# Patient Record
Sex: FEMALE
[De-identification: names, ages, dates, MRNs, and addresses within clinical notes are randomized per-mention and may not be internally consistent; named-entity substitution may affect disease eponyms.]

---

## 2018-03-07 ENCOUNTER — HOSPITAL ENCOUNTER (EMERGENCY)
Dept: HOSPITAL 41 - JD.ED | Age: 43
Discharge: HOME | End: 2018-03-07
Payer: COMMERCIAL

## 2018-03-07 VITALS — DIASTOLIC BLOOD PRESSURE: 86 MMHG | SYSTOLIC BLOOD PRESSURE: 139 MMHG

## 2018-03-07 DIAGNOSIS — R10.13: Primary | ICD-10-CM

## 2018-03-07 DIAGNOSIS — Z88.2: ICD-10-CM

## 2018-03-07 DIAGNOSIS — F17.210: ICD-10-CM

## 2018-03-07 DIAGNOSIS — Z88.8: ICD-10-CM

## 2018-03-07 PROCEDURE — 36415 COLL VENOUS BLD VENIPUNCTURE: CPT

## 2018-03-07 PROCEDURE — 96374 THER/PROPH/DIAG INJ IV PUSH: CPT

## 2018-03-07 PROCEDURE — 85025 COMPLETE CBC W/AUTO DIFF WBC: CPT

## 2018-03-07 PROCEDURE — 96361 HYDRATE IV INFUSION ADD-ON: CPT

## 2018-03-07 PROCEDURE — 86140 C-REACTIVE PROTEIN: CPT

## 2018-03-07 PROCEDURE — 99284 EMERGENCY DEPT VISIT MOD MDM: CPT

## 2018-03-07 PROCEDURE — 80053 COMPREHEN METABOLIC PANEL: CPT

## 2018-03-07 PROCEDURE — 86677 HELICOBACTER PYLORI ANTIBODY: CPT

## 2018-03-07 PROCEDURE — 81001 URINALYSIS AUTO W/SCOPE: CPT

## 2018-03-07 PROCEDURE — 96375 TX/PRO/DX INJ NEW DRUG ADDON: CPT

## 2018-03-07 PROCEDURE — 93005 ELECTROCARDIOGRAM TRACING: CPT

## 2018-03-07 PROCEDURE — 83690 ASSAY OF LIPASE: CPT

## 2018-03-07 PROCEDURE — 84484 ASSAY OF TROPONIN QUANT: CPT

## 2018-03-07 NOTE — EDM.PDOC
ED HPI GENERAL MEDICAL PROBLEM





- General


Chief Complaint: Abdominal Pain


Stated Complaint: INTENSE ABDOMINAL PAIN


Time Seen by Provider: 03/07/18 17:24


Source of Information: Reports: Patient





- History of Present Illness


INITIAL COMMENTS - FREE TEXT/NARRATIVE: 


Patient is sent here today from the clinic for acute epigastric pain since 

earlier today.  Patient states she does have chronic heartburn but this is 

nothing like her typical heartburn.  She denies any chest pain.  She does have 

some nausea, denies any vomiting or diarrhea.


Patient denies any change in her diet.


Bowel movements have been regular, she did have one today.


Patient notes that she is currently also being treated for hypertriglyceridemia.


She drinks alcohol one or 2 times a week 2 or 3 drinks at a sitting.


She reports a fairly healthy diet, she does have very limited fatty and fried 

foods due to her elevated triglycerides.





  ** Abdominal


Pain Score (Numeric/FACES): 5





- Related Data


 Allergies











Allergy/AdvReac Type Severity Reaction Status Date / Time


 


NSAIDS (Non-Steroidal Allergy  Itching Verified 03/07/18 16:34





Anti-Inflamma     


 


Sulfa (Sulfonamide Allergy  Rash Verified 03/07/18 16:34





Antibiotics)     











Home Meds: 


 Home Meds





Gemfibrozil [Lopid] 600 mg PO BIDAC 03/07/18 [History]


hydrOXYzine HCl [Atarax] 10 mg PO DAILY 03/07/18 [History]


traMADol [Ultram] 50 mg PO Q6H PRN 03/07/18 [History]











Past Medical History


Cardiovascular History: Reports: High Cholesterol, Hypertension





Social & Family History





- Tobacco Use


Smoking Status *Q: Current Every Day Smoker


Years of Tobacco use: 25


Packs/Tins Daily: 0.5


Second Hand Smoke Exposure: No





- Caffeine Use


Caffeine Use: Reports: Coffee





- Alcohol Use


Days Per Week of Alcohol Use: 7


Number of Drinks Per Day: 2


Total Drinks Per Week: 14





- Recreational Drug Use


Recreational Drug Use: No





ED ROS GENERAL





- Review of Systems


Review Of Systems: See Below


Constitutional: Reports: Decreased Appetite.  Denies: Fever, Chills, Malaise, 

Weakness, Fatigue


HEENT: Reports: No Symptoms


Respiratory: Reports: No Symptoms


Cardiovascular: Reports: No Symptoms


GI/Abdominal: Reports: Abdominal Pain, Decreased Appetite.  Denies: Anorexia, 

Black Stool, Bloody Stool, Diarrhea, Nausea


: Reports: No Symptoms


Musculoskeletal: Reports: No Symptoms


Skin: Reports: No Symptoms





ED EXAM, GI/ABD





- Physical Exam


Exam: See Below


Exam Limited By: Altered Mental Status


General Appearance: Alert, WD/WN, No Apparent Distress


Throat/Mouth: Normal Inspection, Normal Oropharynx


Head: Atraumatic, Normocephalic


Neck: Normal Inspection, Supple, Non-Tender


Respiratory/Chest: No Respiratory Distress, Lungs Clear, Normal Breath Sounds


Cardiovascular: Regular Rate, Rhythm, No Murmur


GI/Abdominal Exam: Normal Bowel Sounds, Soft, Tender (Moderate epigastric 

tenderness), Other (Negative sr sign)


Neurological: Alert, Oriented, CN II-XII Intact


Skin Exam: Warm, Dry, Intact





EKG INTERPRETATION


EKG Date: 03/07/18


Time: 17:50


Rhythm: NSR


Rate (Beats/Min): 95





Course





- Vital Signs


Last Recorded V/S: 


 Last Vital Signs











Temp  97.9 F   03/07/18 16:31


 


Pulse  86   03/07/18 18:47


 


Resp  18   03/07/18 18:47


 


BP  139/86   03/07/18 18:47


 


Pulse Ox  95   03/07/18 18:47














- Orders/Labs/Meds


Orders: 


 Active Orders 24 hr











 Category Date Time Status


 


 EKG 12 Lead [EKG Documentation Completion] [RC] STAT Care  03/07/18 17:24 

Active











Labs: 


 Laboratory Tests











  03/07/18 03/07/18 03/07/18 Range/Units





  17:35 17:35 17:35 


 


WBC  10.89 H    (3.98-10.04)  K/mm3


 


RBC  4.50    (3.98-5.22)  M/mm3


 


Hgb  14.7    (11.2-15.7)  gm/L


 


Hct  43.5    (34.1-44.9)  %


 


MCV  96.7 H    (79.4-94.8)  fl


 


MCH  32.7 H    (25.6-32.2)  pg


 


MCHC  33.8    (32.2-35.5)  g/dl


 


RDW Std Deviation  44.9    (36.4-46.3)  fL


 


Plt Count  238    (182-369)  K/mm3


 


MPV  9.9    (9.4-12.3)  fl


 


Neutrophils % (Manual)  80 H    (40-60)  %


 


Band Neutrophils %  0    (0-10)  %


 


Lymphocytes % (Manual)  16 L    (20-40)  %


 


Atypical Lymphs %  0    %


 


Monocytes % (Manual)  3    (2-10)  %


 


Eosinophils % (Manual)  0 L    (0.7-5.8)  %


 


Basophils % (Manual)  1    (0.1-1.2)  


 


Platelet Estimate  Adequate    


 


Plt Morphology Comment  Normal    


 


RBC Morph Comment  Normal    


 


Sodium   136   (136-145)  mEq/L


 


Potassium   4.0   (3.5-5.1)  mEq/L


 


Chloride   100   ()  mEq/L


 


Carbon Dioxide   26   (21-32)  mEq/L


 


Anion Gap   14.0   (5-15)  


 


BUN   13   (7-18)  mg/dL


 


Creatinine   0.6   (0.55-1.02)  mg/dL


 


Est Cr Clr Drug Dosing   98.58   mL/min


 


Estimated GFR (MDRD)   > 60   (>60)  mL/min


 


BUN/Creatinine Ratio   21.7 H   (14-18)  


 


Glucose   97   ()  mg/dL


 


Calcium   9.2   (8.5-10.1)  mg/dL


 


Total Bilirubin   0.2   (0.2-1.0)  mg/dL


 


AST   19   (15-37)  U/L


 


ALT   23   (14-59)  U/L


 


Alkaline Phosphatase   73   ()  U/L


 


Troponin I   < 0.017   (0.00-0.056)  ng/mL


 


C-Reactive Protein   < 0.2   (<1.0)  mg/dL


 


Total Protein   7.5   (6.4-8.2)  g/dl


 


Albumin   3.7   (3.4-5.0)  g/dl


 


Globulin   3.8   gm/dL


 


Albumin/Globulin Ratio   1.0   (1-2)  


 


Lipase   136   ()  U/L


 


Urine Color     (Yellow)  


 


Urine Appearance     (Clear)  


 


Urine pH     (5.0-8.0)  


 


Ur Specific Gravity     (1.005-1.030)  


 


Urine Protein     (Negative)  


 


Urine Glucose (UA)     (Negative)  


 


Urine Ketones     (Negative)  


 


Urine Occult Blood     (Negative)  


 


Urine Nitrite     (Negative)  


 


Urine Bilirubin     (Negative)  


 


Urine Urobilinogen     (0.2-1.0)  


 


Ur Leukocyte Esterase     (Negative)  


 


Urine RBC     (0-5)  /hpf


 


Urine WBC     (0-5)  /hpf


 


Ur Epithelial Cells     (0-5)  /hpf


 


Urine Bacteria     (FEW)  /hpf


 


Urine Mucus     (FEW)  /hpf


 


H. pylori IgG Antibody    Negative  (NEGATIVE)  














  03/07/18 Range/Units





  18:50 


 


WBC   (3.98-10.04)  K/mm3


 


RBC   (3.98-5.22)  M/mm3


 


Hgb   (11.2-15.7)  gm/L


 


Hct   (34.1-44.9)  %


 


MCV   (79.4-94.8)  fl


 


MCH   (25.6-32.2)  pg


 


MCHC   (32.2-35.5)  g/dl


 


RDW Std Deviation   (36.4-46.3)  fL


 


Plt Count   (182-369)  K/mm3


 


MPV   (9.4-12.3)  fl


 


Neutrophils % (Manual)   (40-60)  %


 


Band Neutrophils %   (0-10)  %


 


Lymphocytes % (Manual)   (20-40)  %


 


Atypical Lymphs %   %


 


Monocytes % (Manual)   (2-10)  %


 


Eosinophils % (Manual)   (0.7-5.8)  %


 


Basophils % (Manual)   (0.1-1.2)  


 


Platelet Estimate   


 


Plt Morphology Comment   


 


RBC Morph Comment   


 


Sodium   (136-145)  mEq/L


 


Potassium   (3.5-5.1)  mEq/L


 


Chloride   ()  mEq/L


 


Carbon Dioxide   (21-32)  mEq/L


 


Anion Gap   (5-15)  


 


BUN   (7-18)  mg/dL


 


Creatinine   (0.55-1.02)  mg/dL


 


Est Cr Clr Drug Dosing   mL/min


 


Estimated GFR (MDRD)   (>60)  mL/min


 


BUN/Creatinine Ratio   (14-18)  


 


Glucose   ()  mg/dL


 


Calcium   (8.5-10.1)  mg/dL


 


Total Bilirubin   (0.2-1.0)  mg/dL


 


AST   (15-37)  U/L


 


ALT   (14-59)  U/L


 


Alkaline Phosphatase   ()  U/L


 


Troponin I   (0.00-0.056)  ng/mL


 


C-Reactive Protein   (<1.0)  mg/dL


 


Total Protein   (6.4-8.2)  g/dl


 


Albumin   (3.4-5.0)  g/dl


 


Globulin   gm/dL


 


Albumin/Globulin Ratio   (1-2)  


 


Lipase   ()  U/L


 


Urine Color  Light yellow  (Yellow)  


 


Urine Appearance  Clear  (Clear)  


 


Urine pH  6.5  (5.0-8.0)  


 


Ur Specific Gravity  1.020  (1.005-1.030)  


 


Urine Protein  Negative  (Negative)  


 


Urine Glucose (UA)  Negative  (Negative)  


 


Urine Ketones  Negative  (Negative)  


 


Urine Occult Blood  Negative  (Negative)  


 


Urine Nitrite  Negative  (Negative)  


 


Urine Bilirubin  Negative  (Negative)  


 


Urine Urobilinogen  0.2  (0.2-1.0)  


 


Ur Leukocyte Esterase  Negative  (Negative)  


 


Urine RBC  0-5  (0-5)  /hpf


 


Urine WBC  0-5  (0-5)  /hpf


 


Ur Epithelial Cells  5-10 H  (0-5)  /hpf


 


Urine Bacteria  Not seen  (FEW)  /hpf


 


Urine Mucus  Not seen  (FEW)  /hpf


 


H. pylori IgG Antibody   (NEGATIVE)  











Meds: 


Medications














Discontinued Medications














Generic Name Dose Route Start Last Admin





  Trade Name Freq  PRN Reason Stop Dose Admin


 


Al Hydroxide/Mg Hydroxide 30  0 ml  03/07/18 19:10  03/07/18 19:19





  ml/ Lidocaine HCl 15 ml  PO  03/07/18 19:11  45 ml





  ONETIME ONE   Administration


 


Hydromorphone HCl  0.5 mg  03/07/18 17:24  03/07/18 17:50





  Dilaudid  IVPUSH  03/07/18 17:25  0.5 mg





  ONETIME ONE   Administration


 


Sodium Chloride  1,000 mls @ 999 mls/hr  03/07/18 17:24  03/07/18 17:52





  Normal Saline  IV  03/07/18 18:24  999 mls/hr





  ONETIME ONE   Administration


 


Ondansetron HCl  4 mg  03/07/18 17:24  03/07/18 17:48





  Zofran  IVPUSH  03/07/18 17:25  4 mg





  ONETIME ONE   Administration














- Re-Assessments/Exams


Free Text/Narrative Re-Assessment/Exam: 


Patient had relief of nausea with Zofran, she actually had more significant 

improvement with the GI cocktail.


WBC is elevated at 10,890 with 80% neutrophils and no bands.  CRP is <0.2.


Liver enzymes and bilirubin normal.  Lipase 136.  Negative troponin, negative 

H. pylori.


Not acute abdomen, patient does not have any right upper quadrant tenderness 

and has negative Sr sign.





With her significant response to the GI cocktail will have her start over-the-

counter Prilosec 20 mg daily.  


She is to follow-up with her primary provider, advised patient that she 

certainly can return to emergency room if needed.





03/07/18 19:52








Departure





- Departure


Time of Disposition: 19:50


Disposition: Home, Self-Care 01


Condition: Good


Clinical Impression: 


 Epigastric abdominal pain








- Discharge Information


Referrals: 


Kathy Mccormick PA-C [Primary Care Provider] - 


Forms:  ED Department Discharge


Additional Instructions: 


[place] diet, no spicy/citrus foods for now.


Start OTC prilosec 20mg daily before breakfast.  


Follow-up with your primary provider in 1-2 weeks or sooner if symptoms not 

improving.  Certainly return to the ER if any worsening.  





- My Orders


Last 24 Hours: 


My Active Orders





03/07/18 17:24


EKG 12 Lead [EKG Documentation Completion] [RC] STAT 














- Assessment/Plan


Last 24 Hours: 


My Active Orders





03/07/18 17:24


EKG 12 Lead [EKG Documentation Completion] [RC] STAT

## 2022-03-23 ENCOUNTER — HOSPITAL ENCOUNTER (OUTPATIENT)
Dept: HOSPITAL 41 - JD.SDS | Age: 47
Discharge: HOME | End: 2022-03-23
Attending: ORTHOPAEDIC SURGERY
Payer: COMMERCIAL

## 2022-03-23 VITALS — DIASTOLIC BLOOD PRESSURE: 78 MMHG | SYSTOLIC BLOOD PRESSURE: 128 MMHG | HEART RATE: 78 BPM

## 2022-03-23 DIAGNOSIS — M17.11: Primary | ICD-10-CM

## 2022-03-23 DIAGNOSIS — F17.210: ICD-10-CM

## 2022-03-23 DIAGNOSIS — Z79.899: ICD-10-CM

## 2022-03-23 DIAGNOSIS — E66.9: ICD-10-CM

## 2022-03-23 DIAGNOSIS — Z88.2: ICD-10-CM

## 2022-03-23 DIAGNOSIS — H54.7: ICD-10-CM

## 2022-03-23 DIAGNOSIS — E78.00: ICD-10-CM

## 2022-03-23 DIAGNOSIS — I10: ICD-10-CM

## 2022-03-23 DIAGNOSIS — K21.9: ICD-10-CM

## 2022-03-23 DIAGNOSIS — Z88.1: ICD-10-CM

## 2022-03-23 DIAGNOSIS — Z88.6: ICD-10-CM

## 2022-03-23 PROCEDURE — 73560 X-RAY EXAM OF KNEE 1 OR 2: CPT

## 2022-03-23 PROCEDURE — 97116 GAIT TRAINING THERAPY: CPT

## 2022-03-23 PROCEDURE — C1713 ANCHOR/SCREW BN/BN,TIS/BN: HCPCS

## 2022-03-23 PROCEDURE — 97161 PT EVAL LOW COMPLEX 20 MIN: CPT

## 2022-03-23 PROCEDURE — C1776 JOINT DEVICE (IMPLANTABLE): HCPCS

## 2022-03-23 PROCEDURE — 27447 TOTAL KNEE ARTHROPLASTY: CPT

## 2022-07-13 ENCOUNTER — HOSPITAL ENCOUNTER (OUTPATIENT)
Dept: HOSPITAL 41 - JD.SDS | Age: 47
Discharge: HOME | End: 2022-07-13
Attending: ORTHOPAEDIC SURGERY
Payer: COMMERCIAL

## 2022-07-13 VITALS — SYSTOLIC BLOOD PRESSURE: 101 MMHG | DIASTOLIC BLOOD PRESSURE: 70 MMHG

## 2022-07-13 VITALS — HEART RATE: 77 BPM

## 2022-07-13 DIAGNOSIS — F32.A: ICD-10-CM

## 2022-07-13 DIAGNOSIS — G62.9: ICD-10-CM

## 2022-07-13 DIAGNOSIS — Z98.890: ICD-10-CM

## 2022-07-13 DIAGNOSIS — G47.00: ICD-10-CM

## 2022-07-13 DIAGNOSIS — E66.9: ICD-10-CM

## 2022-07-13 DIAGNOSIS — I10: ICD-10-CM

## 2022-07-13 DIAGNOSIS — M17.11: Primary | ICD-10-CM

## 2022-07-13 DIAGNOSIS — F17.210: ICD-10-CM

## 2022-07-13 DIAGNOSIS — Z88.2: ICD-10-CM

## 2022-07-13 DIAGNOSIS — Z79.899: ICD-10-CM

## 2022-07-13 DIAGNOSIS — K21.9: ICD-10-CM

## 2022-07-13 DIAGNOSIS — E55.9: ICD-10-CM

## 2022-07-13 DIAGNOSIS — Z88.1: ICD-10-CM

## 2022-07-13 DIAGNOSIS — E78.00: ICD-10-CM

## 2022-07-13 DIAGNOSIS — Z88.8: ICD-10-CM

## 2022-07-13 PROCEDURE — C1713 ANCHOR/SCREW BN/BN,TIS/BN: HCPCS

## 2022-07-13 PROCEDURE — 0055T BONE SRGRY CMPTR CT/MRI IMAG: CPT

## 2022-07-13 PROCEDURE — 97161 PT EVAL LOW COMPLEX 20 MIN: CPT

## 2022-07-13 PROCEDURE — 73560 X-RAY EXAM OF KNEE 1 OR 2: CPT

## 2022-07-13 PROCEDURE — 97116 GAIT TRAINING THERAPY: CPT

## 2022-07-13 PROCEDURE — 27447 TOTAL KNEE ARTHROPLASTY: CPT

## 2022-07-13 PROCEDURE — C1776 JOINT DEVICE (IMPLANTABLE): HCPCS

## 2023-04-22 ENCOUNTER — HOSPITAL ENCOUNTER (EMERGENCY)
Dept: HOSPITAL 41 - JD.ED | Age: 48
Discharge: HOME | End: 2023-04-22
Payer: COMMERCIAL

## 2023-04-22 VITALS — HEART RATE: 96 BPM | DIASTOLIC BLOOD PRESSURE: 77 MMHG | SYSTOLIC BLOOD PRESSURE: 127 MMHG

## 2023-04-22 DIAGNOSIS — Z88.2: ICD-10-CM

## 2023-04-22 DIAGNOSIS — S39.012A: Primary | ICD-10-CM

## 2023-04-22 DIAGNOSIS — E78.00: ICD-10-CM

## 2023-04-22 DIAGNOSIS — Z79.01: ICD-10-CM

## 2023-04-22 DIAGNOSIS — Z88.1: ICD-10-CM

## 2023-04-22 DIAGNOSIS — Z88.8: ICD-10-CM

## 2023-04-22 DIAGNOSIS — R07.81: ICD-10-CM

## 2023-04-22 DIAGNOSIS — M25.562: ICD-10-CM

## 2023-04-22 DIAGNOSIS — E66.9: ICD-10-CM
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
## 2023-04-22 DIAGNOSIS — Z79.899: ICD-10-CM
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
## 2023-04-22 DIAGNOSIS — Z72.0: ICD-10-CM

## 2023-04-22 PROCEDURE — 99283 EMERGENCY DEPT VISIT LOW MDM: CPT
